# Patient Record
Sex: FEMALE | Race: NATIVE HAWAIIAN OR OTHER PACIFIC ISLANDER
[De-identification: names, ages, dates, MRNs, and addresses within clinical notes are randomized per-mention and may not be internally consistent; named-entity substitution may affect disease eponyms.]

---

## 2017-07-19 ENCOUNTER — HOSPITAL ENCOUNTER (EMERGENCY)
Dept: HOSPITAL 67 - ED | Age: 22
Discharge: HOME | End: 2017-07-19
Payer: COMMERCIAL

## 2017-07-19 VITALS — BODY MASS INDEX: 28.88 KG/M2 | WEIGHT: 184 LBS | HEIGHT: 67 IN

## 2017-07-19 VITALS — DIASTOLIC BLOOD PRESSURE: 93 MMHG | SYSTOLIC BLOOD PRESSURE: 141 MMHG | TEMPERATURE: 98.8 F

## 2017-07-19 DIAGNOSIS — R11.2: ICD-10-CM

## 2017-07-19 DIAGNOSIS — G44.89: Primary | ICD-10-CM

## 2017-07-19 PROCEDURE — 96372 THER/PROPH/DIAG INJ SC/IM: CPT

## 2017-07-19 PROCEDURE — 99283 EMERGENCY DEPT VISIT LOW MDM: CPT

## 2018-05-01 ENCOUNTER — HOSPITAL ENCOUNTER (OUTPATIENT)
Dept: HOSPITAL 67 - LABW | Age: 23
Discharge: HOME | End: 2018-05-01
Attending: NURSE PRACTITIONER
Payer: COMMERCIAL

## 2018-05-01 DIAGNOSIS — R53.83: ICD-10-CM

## 2018-05-01 DIAGNOSIS — Z77.011: ICD-10-CM

## 2018-05-01 DIAGNOSIS — R60.0: Primary | ICD-10-CM

## 2018-05-01 LAB
PLATELET # BLD: 259 K/UL (ref 152–353)
POTASSIUM SERPL-SCNC: 3.8 MMOL/L (ref 3.6–5.2)
SODIUM SERPL-SCNC: 134 MMOL/L (ref 136–145)

## 2018-05-01 PROCEDURE — 85027 COMPLETE CBC AUTOMATED: CPT

## 2018-05-01 PROCEDURE — 36415 COLL VENOUS BLD VENIPUNCTURE: CPT

## 2018-05-01 PROCEDURE — 84443 ASSAY THYROID STIM HORMONE: CPT

## 2018-05-01 PROCEDURE — 84436 ASSAY OF TOTAL THYROXINE: CPT

## 2018-05-01 PROCEDURE — 80053 COMPREHEN METABOLIC PANEL: CPT

## 2018-05-01 PROCEDURE — 83655 ASSAY OF LEAD: CPT

## 2020-04-01 ENCOUNTER — NURSE TRIAGE (OUTPATIENT)
Dept: OTHER | Facility: CLINIC | Age: 25
End: 2020-04-01

## 2020-04-01 NOTE — TELEPHONE ENCOUNTER
telehealth options and walk-in clinics. Care advice as documented. Instructed patient to call back with worsening symptoms. Patient verbalized understanding and denies any further questions/concerns. Please do not respond to the triage nurse through this encounter. Any subsequent communication should be directly with the patient.

## 2020-07-25 ENCOUNTER — TELEPHONE ENCOUNTER (OUTPATIENT)
Dept: URBAN - METROPOLITAN AREA CLINIC 13 | Facility: CLINIC | Age: 25
End: 2020-07-25

## 2020-07-25 RX ORDER — OMEPRAZOLE 20 MG/1
TAKE 1 CAPSULE DAILY CAPSULE, DELAYED RELEASE ORAL
Qty: 30 | Refills: 5 | OUTPATIENT
End: 2016-08-23

## 2020-07-25 RX ORDER — OMEPRAZOLE 20 MG/1
TAKE 1 CAPSULE DAILY CAPSULE, DELAYED RELEASE ORAL
Qty: 90 | Refills: 0 | OUTPATIENT
Start: 2014-09-03 | End: 2014-11-05

## 2020-07-25 RX ORDER — GENTAMICIN SULFATE 1 MG/G
APPLY SPARINGLY TO AFFECTED AREA(S) 4 TIMES A DAY CREAM TOPICAL
Refills: 0 | OUTPATIENT
End: 2015-12-04

## 2020-07-25 RX ORDER — CHLORDIAZEPOXIDE HYDROCHLORIDE AND CLIDINIUM BROMIDE 5; 2.5 MG/1; MG/1
TAKE 1 CAPSULE 3 TIMES DAILY AS NEEDED CAPSULE ORAL
Qty: 60 | Refills: 2 | OUTPATIENT
Start: 2014-10-08 | End: 2015-06-12

## 2020-07-25 RX ORDER — NORTRIPTYLINE HYDROCHLORIDE 25 MG/1
TAKE 1 CAPSULE AT BEDTIME CAPSULE ORAL
Qty: 30 | Refills: 3 | OUTPATIENT
Start: 2016-06-24 | End: 2016-08-23

## 2020-07-25 RX ORDER — GENTAMICIN SULFATE 1 MG/G
OINTMENT TOPICAL
Qty: 15 | Refills: 0 | OUTPATIENT
Start: 2014-10-01 | End: 2014-11-05

## 2020-07-25 RX ORDER — HYDROCODONE BITARTRATE AND ACETAMINOPHEN 7.5; 325 MG/1; MG/1
TAKE 1 TABLET DAILY PRN TABLET ORAL
Refills: 0 | OUTPATIENT
Start: 2014-08-11 | End: 2014-11-05

## 2020-07-25 RX ORDER — AMITRIPTYLINE HYDROCHLORIDE 25 MG/1
TAKE TWO TABLETS BY MOUTH EVERY NIGHT AT BEDTIME TABLET, FILM COATED ORAL
Qty: 60 | Refills: 1 | OUTPATIENT
Start: 2015-01-28 | End: 2017-09-01

## 2020-07-25 RX ORDER — AMOXICILLIN 500 MG/1
TAKE 1 CAPSULE 3 TIMES DAILY CAPSULE ORAL
Refills: 0 | OUTPATIENT
End: 2017-12-04

## 2020-07-25 RX ORDER — AMOXICILLIN AND CLAVULANATE POTASSIUM 875; 125 MG/1; MG/1
TAKE 1 TABLET EVERY 12 HOURS DAILY TABLET, FILM COATED ORAL
Qty: 10 | Refills: 0 | OUTPATIENT
End: 2015-12-04

## 2020-07-25 RX ORDER — HYOSCYAMINE SULFATE 0.12 MG/1
PLACE 1 TABLET EVERY 4-6 HOURS PRN ABDOMINAL PAIN TABLET, ORALLY DISINTEGRATING ORAL
Qty: 60 | Refills: 3 | OUTPATIENT
Start: 2016-07-08 | End: 2016-08-23

## 2020-07-25 RX ORDER — CEFDINIR 300 MG/1
TAKE 1 CAPSULE EVERY 12 HOURS DAILY CAPSULE ORAL
Refills: 0 | OUTPATIENT
End: 2015-12-04

## 2020-07-25 RX ORDER — OMEPRAZOLE 40 MG/1
TAKE 1 CAPSULE DAILY CAPSULE, DELAYED RELEASE ORAL
Qty: 90 | Refills: 3 | OUTPATIENT
Start: 2015-02-27 | End: 2015-06-12

## 2020-07-25 RX ORDER — BUSPIRONE HYDROCHLORIDE 5 MG/1
TAKE 1 TABLET TWICE DAILY TABLET ORAL
Qty: 60 | Refills: 1 | OUTPATIENT
Start: 2017-08-09 | End: 2017-12-04

## 2020-07-25 RX ORDER — CITALOPRAM HYDROBROMIDE 10 MG/1
TAKE 1 TABLET DAILY TABLET, FILM COATED ORAL
Qty: 30 | Refills: 0 | OUTPATIENT
Start: 2014-09-12 | End: 2014-11-05

## 2020-07-25 RX ORDER — ALBUTEROL SULFATE 90 UG/1
INHALE 1 TO 2 PUFFS EVERY 6 HOURS AS NEEDED AEROSOL, METERED RESPIRATORY (INHALATION)
Refills: 0 | OUTPATIENT
Start: 2013-11-18 | End: 2015-06-12

## 2020-07-25 RX ORDER — BUSPIRONE HYDROCHLORIDE 5 MG/1
TAKE 1 TABLET TWICE DAILY TABLET ORAL
Qty: 60 | Refills: 2 | OUTPATIENT
Start: 2016-05-12 | End: 2016-06-24

## 2020-07-25 RX ORDER — AZELASTINE HYDROCHLORIDE AND FLUTICASONE PROPIONATE 137; 50 UG/1; UG/1
INSTILL 1 SQUIRT EVERY TWELVE HOURS PRN SPRAY, METERED NASAL
Refills: 0 | OUTPATIENT
Start: 2013-11-19 | End: 2014-11-05

## 2020-07-25 RX ORDER — HYDROCORTISONE ACETATE 25 MG/1
INSERT 1 SUPP BEDTIME SUPPOSITORY RECTAL
Qty: 12 | Refills: 0 | OUTPATIENT
Start: 2016-07-08 | End: 2016-08-23

## 2020-07-26 ENCOUNTER — TELEPHONE ENCOUNTER (OUTPATIENT)
Dept: URBAN - METROPOLITAN AREA CLINIC 13 | Facility: CLINIC | Age: 25
End: 2020-07-26

## 2020-07-26 RX ORDER — ETODOLAC 400 MG/1
TABLET, COATED ORAL
Qty: 20 | Refills: 0 | Status: ACTIVE | COMMUNITY
Start: 2018-04-19

## 2020-07-26 RX ORDER — BROMPHENIRAMINE MALEATE, PSEUDOEPHEDRINE HYDROCHLORIDE, 2; 30; 10 MG/5ML; MG/5ML; MG/5ML
TAKE 1-2 TEASPOONFULS BY MOUTH 3 TIMES DAILY FOR 7 DAYS SYRUP ORAL
Qty: 120 | Refills: 0 | Status: ACTIVE | COMMUNITY
Start: 2017-12-31

## 2020-07-26 RX ORDER — AMOXICILLIN AND CLAVULANATE POTASSIUM 875; 125 MG/1; MG/1
TABLET, FILM COATED ORAL
Qty: 24 | Refills: 0 | Status: ACTIVE | COMMUNITY
Start: 2013-11-18

## 2020-07-26 RX ORDER — BENZONATATE 100 MG/1
CAPSULE ORAL
Qty: 20 | Refills: 0 | Status: ACTIVE | COMMUNITY
Start: 2014-02-24

## 2020-07-26 RX ORDER — MELOXICAM 7.5 MG/1
TABLET ORAL
Qty: 30 | Refills: 0 | Status: ACTIVE | COMMUNITY
Start: 2014-01-21

## 2020-07-26 RX ORDER — CEPHALEXIN 500 MG/1
CAPSULE ORAL
Qty: 40 | Refills: 0 | Status: ACTIVE | COMMUNITY
Start: 2015-03-29

## 2020-07-26 RX ORDER — CEFDINIR 300 MG/1
CAPSULE ORAL
Qty: 20 | Refills: 0 | Status: ACTIVE | COMMUNITY
Start: 2014-02-24

## 2020-07-26 RX ORDER — OSELTAMIVIR PHOSPHATE 75 MG/1
TAKE ONE CAPSULE BY MOUTH TWICE A DAY FOR 5 DAYS CAPSULE ORAL
Qty: 10 | Refills: 0 | Status: ACTIVE | COMMUNITY
Start: 2017-12-31

## 2020-07-26 RX ORDER — AMOXICILLIN AND CLAVULANATE POTASSIUM 875; 125 MG/1; MG/1
TABLET, FILM COATED ORAL
Qty: 20 | Refills: 0 | Status: ACTIVE | COMMUNITY
Start: 2015-05-15

## 2020-07-26 RX ORDER — HYDROCODONE BITARTRATE AND ACETAMINOPHEN 5; 325 MG/1; MG/1
TABLET ORAL
Qty: 14 | Refills: 0 | Status: ACTIVE | COMMUNITY
Start: 2018-04-19

## 2020-07-26 RX ORDER — FLUCONAZOLE 150 MG/1
TABLET ORAL
Qty: 2 | Refills: 0 | Status: ACTIVE | COMMUNITY
Start: 2018-01-16

## 2020-07-26 RX ORDER — HYDROCORTISONE ACETATE 25 MG/1
SUPPOSITORY RECTAL
Qty: 90 | Refills: 0 | Status: ACTIVE | COMMUNITY
Start: 2013-12-30

## 2020-07-26 RX ORDER — AMITRIPTYLINE HYDROCHLORIDE 25 MG/1
TAKE 1 TABLET AT BEDTIME TABLET, FILM COATED ORAL
Qty: 90 | Refills: 3 | Status: ACTIVE | COMMUNITY
Start: 2017-12-04

## 2020-07-26 RX ORDER — PENICILLIN V POTASSIUM 500 MG/1
TABLET, FILM COATED ORAL
Qty: 28 | Refills: 0 | Status: ACTIVE | COMMUNITY
Start: 2018-02-22

## 2020-07-26 RX ORDER — FOLIC ACID 1 MG/1
TAKE 1 TABLET DAILY TABLET ORAL
Refills: 6 | Status: ACTIVE | COMMUNITY

## 2020-07-26 RX ORDER — MUPIROCIN 20 MG/G
OINTMENT TOPICAL
Qty: 22 | Refills: 0 | Status: ACTIVE | COMMUNITY
Start: 2015-06-03

## 2020-07-26 RX ORDER — BUTALBITAL, ACETAMINOPHEN, CAFFEINE, AND CODEINE PHOSPHATE 30; 50; 40; 325 MG/1; MG/1; MG/1; MG/1
TAKE 1 CAPLET EVERY 4-6 HOURS DAILY AS NEEDED CAPSULE ORAL
Refills: 0 | Status: ACTIVE | COMMUNITY
Start: 2018-08-15

## 2020-07-26 RX ORDER — CIPROFLOXACIN HYDROCHLORIDE 500 MG/1
TABLET, FILM COATED ORAL
Qty: 20 | Refills: 0 | Status: ACTIVE | COMMUNITY
Start: 2014-08-11

## 2020-07-26 RX ORDER — DOXYCYCLINE 100 MG/1
CAPSULE ORAL
Qty: 20 | Refills: 0 | Status: ACTIVE | COMMUNITY
Start: 2018-01-14

## 2020-07-26 RX ORDER — CEPHALEXIN 500 MG/1
CAPSULE ORAL
Qty: 25 | Refills: 0 | Status: ACTIVE | COMMUNITY
Start: 2018-04-19

## 2020-07-26 RX ORDER — ACETAMINOPHEN AND CODEINE PHOSPHATE 300; 30 MG/1; MG/1
TABLET ORAL
Qty: 15 | Refills: 0 | Status: ACTIVE | COMMUNITY
Start: 2015-03-29

## 2020-07-26 RX ORDER — BUTALBITAL, ACETAMINOPHEN, CAFFEINE, AND CODEINE PHOSPHATE 30; 50; 40; 325 MG/1; MG/1; MG/1; MG/1
CAPSULE ORAL
Qty: 30 | Refills: 0 | Status: ACTIVE | COMMUNITY
Start: 2018-11-20

## 2020-07-26 RX ORDER — AZITHROMYCIN DIHYDRATE 250 MG/1
TABLET, FILM COATED ORAL
Qty: 6 | Refills: 0 | Status: ACTIVE | COMMUNITY
Start: 2015-01-19

## 2020-07-26 RX ORDER — AZITHROMYCIN DIHYDRATE 250 MG/1
TAKE 2 TABLETS BY MOUTH TODAY, THEN TAKE 1 TABLET DAILY FOR 4 DAYS TABLET, FILM COATED ORAL
Qty: 6 | Refills: 0 | Status: ACTIVE | COMMUNITY
Start: 2017-12-31

## 2020-07-26 RX ORDER — CEFDINIR 300 MG/1
CAPSULE ORAL
Qty: 20 | Refills: 0 | Status: ACTIVE | COMMUNITY
Start: 2015-05-19

## 2020-07-26 RX ORDER — CEFDINIR 300 MG/1
CAPSULE ORAL
Qty: 20 | Refills: 0 | Status: ACTIVE | COMMUNITY
Start: 2015-01-13

## 2020-07-26 RX ORDER — OMEPRAZOLE 40 MG/1
TAKE ONE CAPSULE BY MOUTH 1 HOUR BEFORE BREAKFAST DAILY CAPSULE, DELAYED RELEASE ORAL
Qty: 90 | Refills: 3 | Status: ACTIVE | COMMUNITY
Start: 2016-12-19

## 2020-07-26 RX ORDER — PREDNISONE 10 MG/1
TABLET ORAL
Qty: 21 | Refills: 0 | Status: ACTIVE | COMMUNITY
Start: 2018-02-22

## 2020-07-26 RX ORDER — PROMETHAZINE HYDROCHLORIDE AND DEXTROMETHORPHAN HYDROBROMIDE 6.25; 15 MG/5ML; MG/5ML
SOLUTION ORAL
Qty: 200 | Refills: 0 | Status: ACTIVE | COMMUNITY
Start: 2018-01-14

## 2020-09-15 ENCOUNTER — TELEPHONE ENCOUNTER (OUTPATIENT)
Dept: URBAN - METROPOLITAN AREA CLINIC 113 | Facility: CLINIC | Age: 25
End: 2020-09-15

## 2020-10-08 ENCOUNTER — NURSE TRIAGE (OUTPATIENT)
Dept: OTHER | Facility: CLINIC | Age: 25
End: 2020-10-08

## 2020-10-08 NOTE — TELEPHONE ENCOUNTER
Reason for Disposition   MILD to MODERATE vomiting (e.g., 1-5 times/day) and lasts > 48 hours (2 days)    Answer Assessment - Initial Assessment Questions  1. VOMITING SEVERITY: \"How many times have you vomited in the past 24 hours? \"      - MILD:  1 - 2 times/day     - MODERATE: 3 - 5 times/day, decreased oral intake without significant weight loss or symptoms of dehydration     - SEVERE: 6 or more times/day, vomits everything or nearly everything, with significant weight loss, symptoms of dehydration       Every time she eats  2. ONSET: \"When did the vomiting begin? \"       1 week ago  3. FLUIDS: \"What fluids or food have you vomited up today? \" \"Have you been able to keep any fluids down? \"      Drinking fluids, no signs of dehydration. 4. ABDOMINAL PAIN: \"Are your having any abdominal pain? \" If yes : \"How bad is it and what does it feel like? \" (e.g., crampy, dull, intermittent, constant)       Mid abdominal pain  5. DIARRHEA: \"Is there any diarrhea? \" If so, ask: \"How many times today? \"       10x day  6. CONTACTS: \"Is there anyone else in the family with the same symptoms? \"       Son has same symptoms  7. CAUSE: \"What do you think is causing your vomiting? \"      Unknown  8. HYDRATION STATUS: \"Any signs of dehydration? \" (e.g., dry mouth [not only dry lips], too weak to stand) \"When did you last urinate? \"      No signs of dehydration. 9. OTHER SYMPTOMS: \"Do you have any other symptoms? \" (e.g., fever, headache, vertigo, vomiting blood or coffee grounds, recent head injury)      No other symptoms. 10. PREGNANCY: \"Is there any chance you are pregnant? \" \"When was your last menstrual period? \"        No    Protocols used: VOMITING-ADULT-[place]    Received call from [facility]. Caller reporting these symptoms for past 1 week/ Provided care advice to help with symptoms. Call soft transferred to [address] to schedule appointment. Attention Provider:  Thank you for allowing me to participate in the care of your patient. The  patient was connected to triage in response to information provided to the [facility]. Please do not respond through this encounter as the response is not directed to a shared pool.

## 2020-12-15 ENCOUNTER — ERX REFILL RESPONSE (OUTPATIENT)
Age: 25
End: 2020-12-15

## 2020-12-15 RX ORDER — AMITRIPTYLINE HYDROCHLORIDE 25 MG/1
TAKE 1 TABLET BY MOUTH EVERYDAY AT BEDTIME TABLET, FILM COATED ORAL
Qty: 90 | Refills: 0

## 2020-12-24 ENCOUNTER — WEB ENCOUNTER (OUTPATIENT)
Dept: URBAN - METROPOLITAN AREA CLINIC 113 | Facility: CLINIC | Age: 25
End: 2020-12-24

## 2020-12-24 ENCOUNTER — OFFICE VISIT (OUTPATIENT)
Dept: URBAN - METROPOLITAN AREA CLINIC 113 | Facility: CLINIC | Age: 25
End: 2020-12-24
Payer: SELF-PAY

## 2020-12-24 VITALS
WEIGHT: 195 LBS | HEIGHT: 67 IN | SYSTOLIC BLOOD PRESSURE: 102 MMHG | RESPIRATION RATE: 18 BRPM | HEART RATE: 100 BPM | BODY MASS INDEX: 30.61 KG/M2 | TEMPERATURE: 98.9 F | DIASTOLIC BLOOD PRESSURE: 60 MMHG

## 2020-12-24 DIAGNOSIS — K21.9 GASTROESOPHAGEAL REFLUX DISEASE WITHOUT ESOPHAGITIS: ICD-10-CM

## 2020-12-24 DIAGNOSIS — K30 FUNCTIONAL DYSPEPSIA: ICD-10-CM

## 2020-12-24 PROCEDURE — G9902 PT SCRN TBCO AND ID AS USER: HCPCS | Performed by: INTERNAL MEDICINE

## 2020-12-24 PROCEDURE — 99213 OFFICE O/P EST LOW 20 MIN: CPT | Performed by: INTERNAL MEDICINE

## 2020-12-24 PROCEDURE — G8427 DOCREV CUR MEDS BY ELIG CLIN: HCPCS | Performed by: INTERNAL MEDICINE

## 2020-12-24 PROCEDURE — G8483 FLU IMM NO ADMIN DOC REA: HCPCS | Performed by: INTERNAL MEDICINE

## 2020-12-24 RX ORDER — BUTALBITAL, ACETAMINOPHEN, CAFFEINE, AND CODEINE PHOSPHATE 30; 50; 40; 325 MG/1; MG/1; MG/1; MG/1
TAKE 1 CAPLET EVERY 4-6 HOURS DAILY AS NEEDED CAPSULE ORAL
Refills: 0 | Status: ACTIVE | COMMUNITY
Start: 2018-08-15

## 2020-12-24 RX ORDER — OMEPRAZOLE 40 MG/1
TAKE ONE CAPSULE BY MOUTH 1 HOUR BEFORE BREAKFAST DAILY CAPSULE, DELAYED RELEASE ORAL
Qty: 90 | Refills: 3 | Status: ACTIVE | COMMUNITY
Start: 2016-12-19

## 2020-12-24 RX ORDER — OMEPRAZOLE 40 MG/1
TAKE ONE CAPSULE BY MOUTH 1 HOUR BEFORE BREAKFAST DAILY CAPSULE, DELAYED RELEASE ORAL
OUTPATIENT
Start: 2016-12-19

## 2020-12-24 RX ORDER — AMITRIPTYLINE HYDROCHLORIDE 25 MG/1
TAKE 1 TABLET BY MOUTH EVERYDAY AT BEDTIME TABLET, FILM COATED ORAL
Qty: 90 | Refills: 0 | Status: ACTIVE | COMMUNITY

## 2020-12-24 RX ORDER — AMITRIPTYLINE HYDROCHLORIDE 25 MG/1
TAKE 1 TABLET BY MOUTH EVERYDAY AT BEDTIME TABLET, FILM COATED ORAL
OUTPATIENT

## 2020-12-24 RX ORDER — ONDANSETRON 8 MG/1
1 TABLET ON THE TONGUE AND ALLOW TO DISSOLVE  AS NEEDED TABLET, ORALLY DISINTEGRATING ORAL ONCE A DAY
Status: ACTIVE | COMMUNITY

## 2020-12-24 RX ORDER — ONDANSETRON 8 MG/1
1 TABLET ON THE TONGUE AND ALLOW TO DISSOLVE  AS NEEDED TABLET, ORALLY DISINTEGRATING ORAL ONCE A DAY
Qty: 90 | Refills: 3

## 2020-12-24 NOTE — HPI-OTHER HISTORIES
Flexible sigmoidoscopy (9/16/16): mild internal hemorrhoids otherwise normal exam to the distal transverse colon. EGD (6/24/16): normal esophagus, mild gastric erythema, normal duodenum. Gastric and duodenal biopsies were unremarkable.

## 2020-12-24 NOTE — HPI-TODAY'S VISIT:
Ms. Pitts is a 24-year-old with nausea and vomiting, abdominal pain and alternating bowel habits attributed to functional bowel disorder presenting for routine follow-up.  She was last seen on 12/13/19 for follow-up of her chronic intermittent nausea with vomiting, abdominal pain and alternating bowel habits.  she was recommended to continue Benefiber 1 tablespoon twice daily for alternating bowel habits, and continue amitriptyline 25 mg daily at bedtime and use omeprazole 40 mg daily.  She returns for follow-up overall doing well.  She's had recurrence of symptoms over the past few months that coincides with her stopping amitriptyline in September related to shift changes at work.  She continues to take omeprazole 40 mg daily most mornings.  She denies any difficulty swallowing.  Her bowel habits are fairly regular with no melena or red blood per rectum.  Her weight has been stable.

## 2021-04-01 ENCOUNTER — TELEPHONE ENCOUNTER (OUTPATIENT)
Dept: URBAN - METROPOLITAN AREA CLINIC 113 | Facility: CLINIC | Age: 26
End: 2021-04-01

## 2021-04-01 RX ORDER — AMITRIPTYLINE HYDROCHLORIDE 25 MG/1
TAKE 1 TABLET BY MOUTH EVERYDAY AT BEDTIME TABLET, FILM COATED ORAL ONCE A DAY
Qty: 90 | Refills: 3

## 2022-05-26 ENCOUNTER — CLAIMS CREATED FROM THE CLAIM WINDOW (OUTPATIENT)
Dept: URBAN - METROPOLITAN AREA CLINIC 107 | Facility: CLINIC | Age: 27
End: 2022-05-26
Payer: SELF-PAY

## 2022-05-26 ENCOUNTER — WEB ENCOUNTER (OUTPATIENT)
Dept: URBAN - METROPOLITAN AREA CLINIC 107 | Facility: CLINIC | Age: 27
End: 2022-05-26

## 2022-05-26 VITALS
SYSTOLIC BLOOD PRESSURE: 106 MMHG | RESPIRATION RATE: 18 BRPM | BODY MASS INDEX: 32.18 KG/M2 | HEIGHT: 67 IN | DIASTOLIC BLOOD PRESSURE: 80 MMHG | TEMPERATURE: 98.2 F | WEIGHT: 205 LBS | HEART RATE: 101 BPM

## 2022-05-26 DIAGNOSIS — K21.9 GASTROESOPHAGEAL REFLUX DISEASE WITHOUT ESOPHAGITIS: ICD-10-CM

## 2022-05-26 DIAGNOSIS — K30 FUNCTIONAL DYSPEPSIA: ICD-10-CM

## 2022-05-26 DIAGNOSIS — R10.13 ABDOMINAL DISCOMFORT, EPIGASTRIC: ICD-10-CM

## 2022-05-26 PROCEDURE — 99214 OFFICE O/P EST MOD 30 MIN: CPT | Performed by: INTERNAL MEDICINE

## 2022-05-26 RX ORDER — ONDANSETRON 8 MG/1
1 TABLET ON THE TONGUE AND ALLOW TO DISSOLVE  AS NEEDED TABLET, ORALLY DISINTEGRATING ORAL ONCE A DAY
Qty: 90 | Refills: 3

## 2022-05-26 RX ORDER — OMEPRAZOLE 40 MG/1
1 CAPSULE 30 MINUTES BEFORE MEAL CAPSULE, DELAYED RELEASE ORAL ONCE A DAY
Qty: 90 | Refills: 3
Start: 2016-12-19

## 2022-05-26 RX ORDER — OMEPRAZOLE 40 MG/1
TAKE ONE CAPSULE BY MOUTH 1 HOUR BEFORE BREAKFAST DAILY CAPSULE, DELAYED RELEASE ORAL
Status: ACTIVE | COMMUNITY
Start: 2016-12-19

## 2022-05-26 RX ORDER — AMITRIPTYLINE HYDROCHLORIDE 25 MG/1
TAKE 1 TABLET BY MOUTH EVERYDAY AT BEDTIME TABLET, FILM COATED ORAL ONCE A DAY
Qty: 90 | Refills: 3 | Status: ACTIVE | COMMUNITY

## 2022-05-26 RX ORDER — AMITRIPTYLINE HYDROCHLORIDE 25 MG/1
1 TABLET AT BEDTIME TABLET, FILM COATED ORAL ONCE A DAY
Qty: 90 | Refills: 2

## 2022-05-26 RX ORDER — ONDANSETRON 8 MG/1
1 TABLET ON THE TONGUE AND ALLOW TO DISSOLVE  AS NEEDED TABLET, ORALLY DISINTEGRATING ORAL ONCE A DAY
Qty: 90 | Refills: 3 | Status: ACTIVE | COMMUNITY

## 2022-05-26 NOTE — HPI-TODAY'S VISIT:
Ms. Pitts is a 27-year-old with nausea and vomiting, abdominal pain and alternating bowel habits attributed to functional bowel disorder [facility]  She is last seen on 12/24/2020 for follow-up regarding her history of GERD, functional dyspepsia and IBS.  She was recommended to continue amitriptyline 25 mg before bed, use Zofran for relief of exacerbations of nausea, and continue omeprazole 40 mg daily for GERD.  She returns for follow-up reporting that she has had an exacerbation of her longstanding symptoms of nausea, vomiting, abdominal pain for the last months.  She has not been taking her medications since early 2021.  She describes intermittent nausea, nonbloody emesis, upper abdominal pain.  She has some heartburn but no difficulty swallowing.  Her bowel movements are "okay" with occasional exacerbations of constipation relieved by eating sweet potatoes or drinking coffee.  She has been under an increased amount of emotional stress related to the death of 3 close family members in the past 4 months.  She denies any melena or red blood per rectum.

## 2022-05-26 NOTE — HPI-OTHER HISTORIES
Flexible sigmoidoscopy (9/16/16): mild internal hemorrhoids otherwise normal exam to the distal transverse colon. EGD (6/24/16): normal esophagus, mild gastric erythema, normal duodenum. Gastric and duodenal biopsies were unremarkable. Flexible sigmoidoscopy (9/16/16): mild internal hemorrhoids otherwise normal exam to the distal transverse colon. EGD (6/24/16): normal esophagus, mild gastric erythema, normal duodenum. Gastric and duodenal biopsies were unremarkable.

## 2022-06-09 PROBLEM — 3696007: Status: ACTIVE | Noted: 2020-12-24

## 2022-06-09 PROBLEM — 266435005: Status: ACTIVE | Noted: 2020-12-24

## 2022-09-20 ENCOUNTER — OFFICE VISIT (OUTPATIENT)
Dept: URBAN - METROPOLITAN AREA CLINIC 107 | Facility: CLINIC | Age: 27
End: 2022-09-20

## 2022-11-23 ENCOUNTER — OFFICE VISIT (OUTPATIENT)
Dept: URBAN - METROPOLITAN AREA CLINIC 107 | Facility: CLINIC | Age: 27
End: 2022-11-23
Payer: SELF-PAY

## 2022-11-23 VITALS
SYSTOLIC BLOOD PRESSURE: 99 MMHG | RESPIRATION RATE: 18 BRPM | DIASTOLIC BLOOD PRESSURE: 68 MMHG | HEART RATE: 96 BPM | TEMPERATURE: 97.8 F | WEIGHT: 209 LBS | BODY MASS INDEX: 32.8 KG/M2 | HEIGHT: 67 IN

## 2022-11-23 DIAGNOSIS — R11.2 NAUSEA AND VOMITING, UNSPECIFIED VOMITING TYPE: ICD-10-CM

## 2022-11-23 DIAGNOSIS — R10.13 DYSPEPSIA: ICD-10-CM

## 2022-11-23 PROCEDURE — 99214 OFFICE O/P EST MOD 30 MIN: CPT | Performed by: INTERNAL MEDICINE

## 2022-11-23 RX ORDER — PROMETHAZINE HYDROCHLORIDE 12.5 MG/1
1-2 TABLET TABLET ORAL
Qty: 90 | Refills: 3 | OUTPATIENT
Start: 2022-11-23 | End: 2023-03-23

## 2022-11-23 RX ORDER — OMEPRAZOLE 40 MG/1
1 CAPSULE 30 MINUTES BEFORE MEAL CAPSULE, DELAYED RELEASE ORAL ONCE A DAY
Qty: 90 | Refills: 3 | Status: ACTIVE | COMMUNITY
Start: 2016-12-19

## 2022-11-23 RX ORDER — OMEPRAZOLE 40 MG/1
1 CAPSULE 30 MINUTES BEFORE MEAL CAPSULE, DELAYED RELEASE ORAL ONCE A DAY
OUTPATIENT
Start: 2016-12-19

## 2022-11-23 RX ORDER — AMITRIPTYLINE HYDROCHLORIDE 25 MG/1
1 TABLET AT BEDTIME TABLET, FILM COATED ORAL ONCE A DAY
Qty: 90 | Refills: 2 | Status: ACTIVE | COMMUNITY

## 2022-11-23 RX ORDER — ONDANSETRON 8 MG/1
1 TABLET ON THE TONGUE AND ALLOW TO DISSOLVE  AS NEEDED TABLET, ORALLY DISINTEGRATING ORAL ONCE A DAY
OUTPATIENT

## 2022-11-23 RX ORDER — ONDANSETRON 8 MG/1
1 TABLET ON THE TONGUE AND ALLOW TO DISSOLVE  AS NEEDED TABLET, ORALLY DISINTEGRATING ORAL ONCE A DAY
Qty: 90 | Refills: 3 | Status: ACTIVE | COMMUNITY

## 2022-11-23 RX ORDER — AMITRIPTYLINE HYDROCHLORIDE 25 MG/1
1 TABLET AT BEDTIME TABLET, FILM COATED ORAL ONCE A DAY
OUTPATIENT

## 2022-11-23 NOTE — HPI-OTHER HISTORIES
Flexible sigmoidoscopy (9/16/16): mild internal hemorrhoids otherwise normal exam to the distal transverse colon. EGD (6/24/16): normal esophagus, mild gastric erythema, normal duodenum. Gastric and duodenal biopsies were unremarkable.  Flexible sigmoidoscopy (9/16/16): mild internal hemorrhoids otherwise normal exam to the distal transverse colon.

## 2022-11-23 NOTE — HPI-TODAY'S VISIT:
Ms. Pitts is a 27-year-old presenting for follow up nausea and vomiting and functional dyspepsia.  She was last in 05/26/2022 for follow-up regarding GERD without esophagitis and functional dyspepsia.  At that time she was overall doing fairly well but had worsening symptoms due to noncompliance with medications.  She was recommended to resume amitriptyline 25 mg before bed as a nonnarcotic pain modulator, use Zofran for relief of exacerbations of nausea vomiting, and resume omeprazole 40 mg daily.  She returns for follow-up reporting that she is overall not doing well.  She has been fairly compliant with her amitriptyline 25 mg before bed and omeprazole 40 mg daily.  She reports that Zofran has not been helpful.  She has had increasing amounts of nausea and occasional nonbloody emesis.  She reports going to the ER while working in [place] in August.  She recalls the evaluation was negative.  Her bowel movements are fairly normal with no melena or red blood per rectum.  She denies any weight loss, NSAID use.  No alcohol or marijuana use.

## 2022-12-08 PROBLEM — 16932000: Status: ACTIVE | Noted: 2022-11-23

## 2022-12-08 PROBLEM — 162031009: Status: ACTIVE | Noted: 2022-11-23

## 2023-04-12 ENCOUNTER — OFFICE VISIT (OUTPATIENT)
Dept: URBAN - METROPOLITAN AREA CLINIC 107 | Facility: CLINIC | Age: 28
End: 2023-04-12
Payer: SELF-PAY

## 2023-04-12 VITALS
HEART RATE: 96 BPM | TEMPERATURE: 97.3 F | HEIGHT: 67 IN | SYSTOLIC BLOOD PRESSURE: 110 MMHG | DIASTOLIC BLOOD PRESSURE: 76 MMHG | RESPIRATION RATE: 18 BRPM | WEIGHT: 203 LBS | BODY MASS INDEX: 31.86 KG/M2

## 2023-04-12 DIAGNOSIS — K30 FUNCTIONAL DYSPEPSIA: ICD-10-CM

## 2023-04-12 DIAGNOSIS — K21.9 GASTROESOPHAGEAL REFLUX DISEASE WITHOUT ESOPHAGITIS: ICD-10-CM

## 2023-04-12 DIAGNOSIS — R11.2 NAUSEA AND VOMITING, UNSPECIFIED VOMITING TYPE: ICD-10-CM

## 2023-04-12 PROCEDURE — 99213 OFFICE O/P EST LOW 20 MIN: CPT | Performed by: INTERNAL MEDICINE

## 2023-04-12 RX ORDER — ONDANSETRON 8 MG/1
1 TABLET ON THE TONGUE AND ALLOW TO DISSOLVE  AS NEEDED TABLET, ORALLY DISINTEGRATING ORAL ONCE A DAY
OUTPATIENT

## 2023-04-12 RX ORDER — PROMETHAZINE HYDROCHLORIDE 12.5 MG/1
1 TABLET AS NEEDED TABLET ORAL
OUTPATIENT

## 2023-04-12 RX ORDER — ONDANSETRON 8 MG/1
1 TABLET ON THE TONGUE AND ALLOW TO DISSOLVE  AS NEEDED TABLET, ORALLY DISINTEGRATING ORAL ONCE A DAY
Status: ACTIVE | COMMUNITY

## 2023-04-12 RX ORDER — AMITRIPTYLINE HYDROCHLORIDE 25 MG/1
1 TABLET AT BEDTIME TABLET, FILM COATED ORAL ONCE A DAY
OUTPATIENT

## 2023-04-12 RX ORDER — PROMETHAZINE HYDROCHLORIDE 12.5 MG/1
1 TABLET AS NEEDED TABLET ORAL
Status: ACTIVE | COMMUNITY

## 2023-04-12 RX ORDER — AMITRIPTYLINE HYDROCHLORIDE 25 MG/1
1 TABLET AT BEDTIME TABLET, FILM COATED ORAL ONCE A DAY
Status: ACTIVE | COMMUNITY

## 2023-04-12 RX ORDER — OMEPRAZOLE 40 MG/1
1 CAPSULE 30 MINUTES BEFORE MEAL CAPSULE, DELAYED RELEASE ORAL ONCE A DAY
OUTPATIENT
Start: 2016-12-19

## 2023-04-12 RX ORDER — OMEPRAZOLE 40 MG/1
1 CAPSULE 30 MINUTES BEFORE MEAL CAPSULE, DELAYED RELEASE ORAL ONCE A DAY
Status: ACTIVE | COMMUNITY
Start: 2016-12-19

## 2023-04-12 NOTE — HPI-TODAY'S VISIT:
Ms. Pitts is a 28-year-old presenting for follow up nausea and vomiting and functional dyspepsia.  She report for the past 2 weeks having increasing amounts of nausea and vomiting that occur typically after 5 PM.  She believes that her symptoms have been worse related to emotional stressors from her grandmother's hospitalization.  She reports that Zofran and promethazine are somewhat helpful for relieving the nausea and vomiting.  She has not been taking omeprazole secondary to high out-of-pocket cost.  She has not been taking the omeprazole for 3 months.  She denies any dysphagia.  She has some mid abdominal pain that waxes and wanes.  She has been taking NSAIDs for relief of headaches.  Her bowel movements are fairly regular, typically daily to every other day, normal consistency without blood or mucus.

## 2023-04-25 ENCOUNTER — TELEPHONE ENCOUNTER (OUTPATIENT)
Dept: URBAN - METROPOLITAN AREA CLINIC 107 | Facility: CLINIC | Age: 28
End: 2023-04-25

## 2023-04-25 RX ORDER — OMEPRAZOLE 40 MG/1
1 CAPSULE 30 MINUTES BEFORE MEAL CAPSULE, DELAYED RELEASE ORAL ONCE A DAY
Qty: 90 | Refills: 3
Start: 2016-12-19

## 2023-06-03 ENCOUNTER — HOSPITAL ENCOUNTER (EMERGENCY)
Dept: HOSPITAL 67 - ED | Age: 28
Discharge: HOME | End: 2023-06-03
Payer: COMMERCIAL

## 2023-06-03 VITALS — HEIGHT: 67 IN | WEIGHT: 207 LBS | BODY MASS INDEX: 32.49 KG/M2

## 2023-06-03 VITALS — DIASTOLIC BLOOD PRESSURE: 82 MMHG | SYSTOLIC BLOOD PRESSURE: 135 MMHG | TEMPERATURE: 98.5 F
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
## 2023-06-03 DIAGNOSIS — S20.219A: Primary | ICD-10-CM

## 2023-06-03 PROCEDURE — 99283 EMERGENCY DEPT VISIT LOW MDM: CPT

## 2023-06-03 PROCEDURE — 96372 THER/PROPH/DIAG INJ SC/IM: CPT

## 2023-08-09 ENCOUNTER — ERX REFILL RESPONSE (OUTPATIENT)
Dept: URBAN - METROPOLITAN AREA CLINIC 107 | Facility: CLINIC | Age: 28
End: 2023-08-09

## 2023-08-09 RX ORDER — OMEPRAZOLE 40 MG/1
1 CAPSULE 30 MINUTES BEFORE MEAL CAPSULE, DELAYED RELEASE ORAL ONCE A DAY
Qty: 90 | Refills: 3 | OUTPATIENT

## 2023-08-09 RX ORDER — OMEPRAZOLE 40 MG/1
TAKE 1 CAPSULE 30 MINUTES BEFORE MEAL ORAL ONCE A DAY 90 DAYS CAPSULE, DELAYED RELEASE ORAL
Qty: 90 CAPSULE | Refills: 3 | OUTPATIENT

## 2023-09-11 ENCOUNTER — ERX REFILL RESPONSE (OUTPATIENT)
Dept: URBAN - METROPOLITAN AREA CLINIC 107 | Facility: CLINIC | Age: 28
End: 2023-09-11

## 2023-09-11 RX ORDER — AMITRIPTYLINE HYDROCHLORIDE 25 MG/1
1 TABLET AT BEDTIME TABLET, FILM COATED ORAL ONCE A DAY
Qty: 90 TABLET | Refills: 3 | OUTPATIENT

## 2023-09-11 RX ORDER — AMITRIPTYLINE HYDROCHLORIDE 25 MG/1
1 TABLET AT BEDTIME TABLET, FILM COATED ORAL ONCE A DAY
OUTPATIENT

## 2023-09-12 ENCOUNTER — TELEPHONE ENCOUNTER (OUTPATIENT)
Dept: URBAN - METROPOLITAN AREA CLINIC 107 | Facility: CLINIC | Age: 28
End: 2023-09-12

## 2023-09-12 RX ORDER — PROMETHAZINE HYDROCHLORIDE 12.5 MG/1
1 TABLET AS NEEDED AS NEEDED TABLET ORAL
Qty: 60 TABLET | Refills: 3

## 2024-01-26 ENCOUNTER — OFFICE VISIT (OUTPATIENT)
Dept: URBAN - METROPOLITAN AREA CLINIC 107 | Facility: CLINIC | Age: 29
End: 2024-01-26

## 2024-03-21 ENCOUNTER — OV EP (OUTPATIENT)
Dept: URBAN - METROPOLITAN AREA CLINIC 107 | Facility: CLINIC | Age: 29
End: 2024-03-21

## 2024-03-21 VITALS
TEMPERATURE: 97.1 F | RESPIRATION RATE: 18 BRPM | HEART RATE: 89 BPM | HEIGHT: 67 IN | WEIGHT: 212 LBS | SYSTOLIC BLOOD PRESSURE: 81 MMHG | BODY MASS INDEX: 33.27 KG/M2 | DIASTOLIC BLOOD PRESSURE: 48 MMHG

## 2024-03-21 RX ORDER — ONDANSETRON 8 MG/1
1 TABLET ON THE TONGUE AND ALLOW TO DISSOLVE  AS NEEDED TABLET, ORALLY DISINTEGRATING ORAL ONCE A DAY
Status: ON HOLD | COMMUNITY

## 2024-03-21 RX ORDER — AMITRIPTYLINE HYDROCHLORIDE 25 MG/1
1 TABLET AT BEDTIME TABLET, FILM COATED ORAL ONCE A DAY
Qty: 90 TABLET | Refills: 3 | Status: ACTIVE | COMMUNITY

## 2024-03-21 RX ORDER — OMEPRAZOLE 40 MG/1
TAKE 1 CAPSULE 30 MINUTES BEFORE MEAL ORAL ONCE A DAY 90 DAYS CAPSULE, DELAYED RELEASE ORAL
Qty: 90 CAPSULE | Refills: 3 | Status: ACTIVE | COMMUNITY

## 2024-03-21 RX ORDER — PROMETHAZINE HYDROCHLORIDE 12.5 MG/1
1 TABLET AS NEEDED AS NEEDED TABLET ORAL
Qty: 60 TABLET | Refills: 3 | Status: ON HOLD | COMMUNITY

## 2024-04-04 PROBLEM — 197125005: Status: ACTIVE | Noted: 2024-04-04

## 2025-06-11 ENCOUNTER — DASHBOARD ENCOUNTERS (OUTPATIENT)
Age: 30
End: 2025-06-11

## 2025-06-11 ENCOUNTER — OFFICE VISIT (OUTPATIENT)
Dept: URBAN - METROPOLITAN AREA CLINIC 107 | Facility: CLINIC | Age: 30
End: 2025-06-11
Payer: COMMERCIAL

## 2025-06-11 DIAGNOSIS — K30 FUNCTIONAL DYSPEPSIA: ICD-10-CM

## 2025-06-11 DIAGNOSIS — R11.2 NAUSEA AND VOMITING, UNSPECIFIED VOMITING TYPE: ICD-10-CM

## 2025-06-11 DIAGNOSIS — K21.9 GASTROESOPHAGEAL REFLUX DISEASE WITHOUT ESOPHAGITIS: ICD-10-CM

## 2025-06-11 PROCEDURE — 99214 OFFICE O/P EST MOD 30 MIN: CPT | Performed by: INTERNAL MEDICINE

## 2025-06-11 RX ORDER — PROMETHAZINE HYDROCHLORIDE 12.5 MG/1
1-2 TABLET TABLET ORAL
Qty: 60 | Refills: 3 | Status: DISCONTINUED | COMMUNITY

## 2025-06-11 RX ORDER — FAMOTIDINE 40 MG/1
1 TABLET TABLET, FILM COATED ORAL ONCE A DAY
Qty: 30 | Refills: 11 | OUTPATIENT
Start: 2025-06-26

## 2025-06-11 RX ORDER — AMITRIPTYLINE HYDROCHLORIDE 25 MG/1
1 TABLET AT BEDTIME TABLET, FILM COATED ORAL ONCE A DAY
Status: DISCONTINUED | COMMUNITY

## 2025-06-11 RX ORDER — OMEPRAZOLE 40 MG/1
TAKE 1 CAPSULE 30 MINUTES BEFORE MEAL ORAL ONCE A DAY 90 DAYS CAPSULE, DELAYED RELEASE ORAL
Status: DISCONTINUED | COMMUNITY

## 2025-06-11 RX ORDER — ONDANSETRON 8 MG/1
1 TABLET ON THE TONGUE AND ALLOW TO DISSOLVE AS NEEDED TABLET, ORALLY DISINTEGRATING ORAL ONCE A DAY
Qty: 30 | Refills: 3 | Status: DISCONTINUED | COMMUNITY

## 2025-06-11 RX ORDER — ONDANSETRON 4 MG/1
1 TABLET ON THE TONGUE AND ALLOW TO DISSOLVE TABLET, ORALLY DISINTEGRATING ORAL ONCE A DAY
Qty: 30 | Refills: 5 | OUTPATIENT
Start: 2025-06-26

## 2025-06-11 NOTE — HPI-TODAY'S VISIT:
Ms. Pitts is a 30-year-old presenting for follow up regarding IBS and functional dyspepsia.  She returns for follow-up reporting that she is currently 6 weeks pregnant.  She continues to have some issues of nausea with episodic bloody emesis.  She denies any significant abdominal pain.  She has been only taking a prenatal vitamin and fish oil.  Her bowel movements are regular and normal consistency.  No melena or red blood per rectum.  She is no longer taking the amitriptyline.